# Patient Record
Sex: MALE | Race: AMERICAN INDIAN OR ALASKA NATIVE | Employment: FULL TIME | URBAN - METROPOLITAN AREA
[De-identification: names, ages, dates, MRNs, and addresses within clinical notes are randomized per-mention and may not be internally consistent; named-entity substitution may affect disease eponyms.]

---

## 2018-03-14 ENCOUNTER — HOSPITAL ENCOUNTER (EMERGENCY)
Age: 28
Discharge: HOME | End: 2018-03-14
Attending: EMERGENCY MEDICINE | Admitting: EMERGENCY MEDICINE
Payer: SELF-PAY

## 2018-03-14 ENCOUNTER — APPOINTMENT (OUTPATIENT)
Dept: CT IMAGING | Age: 28
End: 2018-03-14
Attending: EMERGENCY MEDICINE
Payer: SELF-PAY

## 2018-03-14 VITALS
DIASTOLIC BLOOD PRESSURE: 92 MMHG | SYSTOLIC BLOOD PRESSURE: 156 MMHG | TEMPERATURE: 98.5 F | HEART RATE: 60 BPM | OXYGEN SATURATION: 98 % | RESPIRATION RATE: 18 BRPM

## 2018-03-14 DIAGNOSIS — R10.84 ABDOMINAL PAIN, GENERALIZED: Primary | ICD-10-CM

## 2018-03-14 LAB
ALBUMIN SERPL-MCNC: 4.2 G/DL (ref 3.4–5)
ALBUMIN/GLOB SERPL: 1.1 {RATIO} (ref 0.8–1.7)
ALP SERPL-CCNC: 88 U/L (ref 45–117)
ALT SERPL-CCNC: 35 U/L (ref 16–61)
ANION GAP SERPL CALC-SCNC: 6 MMOL/L (ref 3–18)
APPEARANCE UR: CLEAR
AST SERPL-CCNC: 20 U/L (ref 15–37)
BASOPHILS # BLD: 0 K/UL (ref 0–0.1)
BASOPHILS NFR BLD: 1 % (ref 0–2)
BILIRUB SERPL-MCNC: 0.2 MG/DL (ref 0.2–1)
BILIRUB UR QL: NEGATIVE
BUN SERPL-MCNC: 11 MG/DL (ref 7–18)
BUN/CREAT SERPL: 13 (ref 12–20)
CALCIUM SERPL-MCNC: 9.4 MG/DL (ref 8.5–10.1)
CHLORIDE SERPL-SCNC: 103 MMOL/L (ref 100–108)
CO2 SERPL-SCNC: 30 MMOL/L (ref 21–32)
COLOR UR: YELLOW
CREAT SERPL-MCNC: 0.85 MG/DL (ref 0.6–1.3)
DIFFERENTIAL METHOD BLD: ABNORMAL
EOSINOPHIL # BLD: 0.7 K/UL (ref 0–0.4)
EOSINOPHIL NFR BLD: 10 % (ref 0–5)
ERYTHROCYTE [DISTWIDTH] IN BLOOD BY AUTOMATED COUNT: 13 % (ref 11.6–14.5)
GLOBULIN SER CALC-MCNC: 3.8 G/DL (ref 2–4)
GLUCOSE SERPL-MCNC: 120 MG/DL (ref 74–99)
GLUCOSE UR STRIP.AUTO-MCNC: NEGATIVE MG/DL
HCT VFR BLD AUTO: 41.1 % (ref 36–48)
HGB BLD-MCNC: 13.8 G/DL (ref 13–16)
HGB UR QL STRIP: NEGATIVE
KETONES UR QL STRIP.AUTO: NEGATIVE MG/DL
LEUKOCYTE ESTERASE UR QL STRIP.AUTO: NEGATIVE
LIPASE SERPL-CCNC: 139 U/L (ref 73–393)
LYMPHOCYTES # BLD: 2.4 K/UL (ref 0.9–3.6)
LYMPHOCYTES NFR BLD: 36 % (ref 21–52)
MCH RBC QN AUTO: 27 PG (ref 24–34)
MCHC RBC AUTO-ENTMCNC: 33.6 G/DL (ref 31–37)
MCV RBC AUTO: 80.4 FL (ref 74–97)
MONOCYTES # BLD: 0.5 K/UL (ref 0.05–1.2)
MONOCYTES NFR BLD: 8 % (ref 3–10)
NEUTS SEG # BLD: 3.1 K/UL (ref 1.8–8)
NEUTS SEG NFR BLD: 45 % (ref 40–73)
NITRITE UR QL STRIP.AUTO: NEGATIVE
PH UR STRIP: 6.5 [PH] (ref 5–8)
PLATELET # BLD AUTO: 262 K/UL (ref 135–420)
PMV BLD AUTO: 9.1 FL (ref 9.2–11.8)
POTASSIUM SERPL-SCNC: 3.6 MMOL/L (ref 3.5–5.5)
PROT SERPL-MCNC: 8 G/DL (ref 6.4–8.2)
PROT UR STRIP-MCNC: NEGATIVE MG/DL
RBC # BLD AUTO: 5.11 M/UL (ref 4.7–5.5)
SODIUM SERPL-SCNC: 139 MMOL/L (ref 136–145)
SP GR UR REFRACTOMETRY: >1.03 (ref 1–1.03)
UROBILINOGEN UR QL STRIP.AUTO: 0.2 EU/DL (ref 0.2–1)
WBC # BLD AUTO: 6.6 K/UL (ref 4.6–13.2)

## 2018-03-14 PROCEDURE — 83690 ASSAY OF LIPASE: CPT | Performed by: EMERGENCY MEDICINE

## 2018-03-14 PROCEDURE — 74011636320 HC RX REV CODE- 636/320: Performed by: EMERGENCY MEDICINE

## 2018-03-14 PROCEDURE — 87491 CHLMYD TRACH DNA AMP PROBE: CPT | Performed by: EMERGENCY MEDICINE

## 2018-03-14 PROCEDURE — 74011250636 HC RX REV CODE- 250/636: Performed by: EMERGENCY MEDICINE

## 2018-03-14 PROCEDURE — 85025 COMPLETE CBC W/AUTO DIFF WBC: CPT | Performed by: EMERGENCY MEDICINE

## 2018-03-14 PROCEDURE — 74177 CT ABD & PELVIS W/CONTRAST: CPT

## 2018-03-14 PROCEDURE — 80053 COMPREHEN METABOLIC PANEL: CPT | Performed by: EMERGENCY MEDICINE

## 2018-03-14 PROCEDURE — 96375 TX/PRO/DX INJ NEW DRUG ADDON: CPT

## 2018-03-14 PROCEDURE — 87086 URINE CULTURE/COLONY COUNT: CPT | Performed by: EMERGENCY MEDICINE

## 2018-03-14 PROCEDURE — 96374 THER/PROPH/DIAG INJ IV PUSH: CPT

## 2018-03-14 PROCEDURE — 99283 EMERGENCY DEPT VISIT LOW MDM: CPT

## 2018-03-14 PROCEDURE — 81003 URINALYSIS AUTO W/O SCOPE: CPT | Performed by: EMERGENCY MEDICINE

## 2018-03-14 RX ORDER — CEFTRIAXONE 250 MG/8ML
250 INJECTION, POWDER, FOR SOLUTION INTRAMUSCULAR; INTRAVENOUS ONCE
Status: COMPLETED | OUTPATIENT
Start: 2018-03-14 | End: 2018-03-14

## 2018-03-14 RX ORDER — DOXYCYCLINE 100 MG/1
100 CAPSULE ORAL 2 TIMES DAILY
Qty: 42 CAP | Refills: 0 | Status: SHIPPED | OUTPATIENT
Start: 2018-03-14 | End: 2018-04-04

## 2018-03-14 RX ORDER — KETOROLAC TROMETHAMINE 30 MG/ML
30 INJECTION, SOLUTION INTRAMUSCULAR; INTRAVENOUS
Status: COMPLETED | OUTPATIENT
Start: 2018-03-14 | End: 2018-03-14

## 2018-03-14 RX ADMIN — IOPAMIDOL 100 ML: 612 INJECTION, SOLUTION INTRAVENOUS at 20:32

## 2018-03-14 RX ADMIN — CEFTRIAXONE SODIUM 250 MG: 250 INJECTION, POWDER, FOR SOLUTION INTRAMUSCULAR; INTRAVENOUS at 21:07

## 2018-03-14 RX ADMIN — KETOROLAC TROMETHAMINE 30 MG: 30 INJECTION, SOLUTION INTRAMUSCULAR; INTRAVENOUS at 21:04

## 2018-03-14 NOTE — ED NOTES
7:35 PM : Pt care transferred to Dr. Monika Freeman  ,ED provider. History of patient complaint(s), available diagnostic reports and current treatment plan has been discussed thoroughly. Bedside rounding on patient occured : yes . Intended disposition of patient : TBD  Pending diagnostics reports and/or labs (please list):     10:10 PM Patient no longer has any abd pain. Dr. Monika Freeman wanted to discharge on 3 weeks of Doxycycline. Will DC. Prostate reportedly benign on rectal exam.      Scribe Attestation     Antonieta Russo acting as a scribe for and in the presence of Momo Saldivar MD      March 14, 2018 at 7:36 PM       Provider Attestation:      I personally performed the services described in the documentation, reviewed the documentation, as recorded by the scribe in my presence, and it accurately and completely records my words and actions.  March 14, 2018 at 7:36 PM - Momo Saldivar MD

## 2018-03-14 NOTE — ED PROVIDER NOTES
EMERGENCY DEPARTMENT HISTORY AND PHYSICAL EXAM    6:29 PM      Date: 3/14/2018  Patient Name: Elijah Ruggiero    History of Presenting Illness     Chief Complaint   Patient presents with    Abdominal Pain    Urinary Pain         History Provided By: Patient    Chief Complaint: Right sided abdominal pain radiating down into his legs bilaterally  Duration:  Weeks  Timing:  Acute, Constant and Progressive  Location: abdomen and bilateral LE  Quality: Aching and Sharp  Severity: Moderate  Modifying Factors: sitting or making BM makes pain worse. Associated Symptoms: pain when making BM      Additional History (Context): Elijah Ruggiero is a [address] [place] y.o. male with No significant past medical history who presents with mild to moderate right sided abdominal pain that is now radiating down in to his legs for the past 3 weeks. The pt also reports he is pain when he is sitting and also having pain when making a BM. Before this he has never had symptoms like this in the past. Denies any abdominal surgeries. He works in construction. Denies any recent trauma or injury. Reports he has only 1 sexual partner. The pt denies CP, SOB, incontinence, fever, chills, weakness, and numbness. No other complaints or concerns in the ED. PCP: Josue Banuelos MD    Current Outpatient Prescriptions   Medication Sig Dispense Refill    dicyclomine (BENTYL) 20 mg tablet Take 1 Tab by mouth every eight (8) hours as needed (abdominal cramps) for up to 15 doses. 15 Tab 0    methocarbamol (ROBAXIN) 750 mg tablet Take 1 Tab by mouth four (4) times daily. 12 Tab 0       Past History     Past Medical History:  Past Medical History:   Diagnosis Date    Obesity (BMI 30.0-34. 9)        Past Surgical History:  No past surgical history on file.     Family History:  Family History   Problem Relation Age of Onset    No Known Problems Mother     No Known Problems Father        Social History:  Social History   Substance Use Topics    Smoking status: Never Smoker    Smokeless tobacco: Never Used    Alcohol use No       Allergies:  No Known Allergies      Review of Systems       Review of Systems   Constitutional: Negative for chills and fever. Respiratory: Negative for shortness of breath. Cardiovascular: Negative for chest pain. Gastrointestinal: Positive for abdominal pain and rectal pain. Negative for diarrhea, nausea and vomiting. Pain during BM   Genitourinary: Negative for dysuria. Musculoskeletal: Positive for myalgias. Negative for neck stiffness. Neurological: Negative for dizziness, weakness, numbness and headaches. All other systems reviewed and are negative. Physical Exam     Visit Vitals    BP (!) 156/92 (BP 1 Location: Right arm, BP Patient Position: At rest)    Pulse 60    Temp 98.5 °F (36.9 °C)    Resp 18    SpO2 98%         Physical Exam   Constitutional: He is oriented to person, place, and time. He appears well-developed and well-nourished. No distress. HENT:   Head: Normocephalic and atraumatic. Right Ear: External ear normal.   Left Ear: External ear normal.   Nose: Nose normal.   Mouth/Throat: Oropharynx is clear and moist.   Eyes: Conjunctivae and EOM are normal. Pupils are equal, round, and reactive to light. No scleral icterus. Neck: Normal range of motion. Neck supple. No JVD present. No tracheal deviation present. No thyromegaly present. Cardiovascular: Normal rate, regular rhythm, normal heart sounds and intact distal pulses. Exam reveals no gallop and no friction rub. No murmur heard. Pulmonary/Chest: Effort normal and breath sounds normal. He exhibits no tenderness. Abdominal: Soft. Bowel sounds are normal. He exhibits no distension. There is tenderness. There is no rebound and no guarding. RLQ pain    Genitourinary: Rectal exam shows guaiac negative stool. No penile tenderness.    Genitourinary Comments: Mild penile discharge, R inguinal fullness, No testicular pain   Prostate mildly tender, partially palpated    Musculoskeletal: Normal range of motion. He exhibits no edema or tenderness. Lymphadenopathy:     He has no cervical adenopathy. Neurological: He is alert and oriented to person, place, and time. No cranial nerve deficit. Coordination normal.   No sensory loss, Gait normal, Motor 5/5   Skin: Skin is warm and dry. Psychiatric: He has a normal mood and affect. His behavior is normal. Judgment and thought content normal.   Nursing note and vitals reviewed. Diagnostic Study Results     Labs -  Recent Results (from the past 12 hour(s))   URINALYSIS W/ RFLX MICROSCOPIC    Collection Time: 03/14/18  5:40 PM   Result Value Ref Range    Color YELLOW      Appearance CLEAR      Specific gravity >1.030 (H) 1.005 - 1.030    pH (UA) 6.5 5.0 - 8.0      Protein NEGATIVE  NEG mg/dL    Glucose NEGATIVE  NEG mg/dL    Ketone NEGATIVE  NEG mg/dL    Bilirubin NEGATIVE  NEG      Blood NEGATIVE  NEG      Urobilinogen 0.2 0.2 - 1.0 EU/dL    Nitrites NEGATIVE  NEG      Leukocyte Esterase NEGATIVE  NEG         Radiologic Studies -   CT ABD PELV W CONT    (Results Pending)         Medical Decision Making   I am the first provider for this patient. I reviewed the vital signs, available nursing notes, past medical history, past surgical history, family history and social history. Vital Signs-Reviewed the patient's vital signs. Pulse Oximetry Analysis -  98 on room air (Interpretation) normal.    Records Reviewed: Nursing Notes and Old Medical Records (Time of Review: 6:29 PM)    ED Course: Progress Notes, Reevaluation, and Consults:      Provider Notes (Medical Decision Making): Pt is a 28yo male with a hx of recurrent RLQ pain with 2 CT scans in the past year, prostatitis as diagnosed by urology presents with 3 weeks of rectal pain, dysuria, and RLQ pain. Pt is eating well and denies weight loss. It sounds like a prostatitis.   I ordered a CT on him but then looked he had 2 reassuring scans in the past year. With an , pt notes he took the doxy without improvement. He notes that the pain increases with lifting and radiates to his back. He denies any bowel changes except for pain with BM. Will proceed with CT and if reassuring start abx with close outpatient care. Cristine Em DO 7:38 PM    7:38 PM : Pt care transferred to Dr. Margarita Jin  ,ED provider. History of patient complaint(s), available diagnostic reports and current treatment plan has been discussed thoroughly. Intended disposition of patient : TBD  Pending diagnostics reports and/or labs (please list): CT AP, dc with abx for prostatitis        Diagnosis     Disposition: Pending      Follow-up Information     None           Patient's Medications   Start Taking    No medications on file   Continue Taking    DICYCLOMINE (BENTYL) 20 MG TABLET    Take 1 Tab by mouth every eight (8) hours as needed (abdominal cramps) for up to 15 doses. METHOCARBAMOL (ROBAXIN) 750 MG TABLET    Take 1 Tab by mouth four (4) times daily. These Medications have changed    No medications on file   Stop Taking    No medications on file     _______________________________    Attestations:  [address]  acting as a scribe for and in the presence of Arpita Hassan MD      March 14, 2018 at 6:29 PM       Provider Attestation:      I personally performed the services described in the documentation, reviewed the documentation, as recorded by the scribe in my presence, and it accurately and completely records my words and actions.  March 14, 2018 at 6:29 PM - Arpita Hassan MD    _______________________________

## 2018-03-15 NOTE — DISCHARGE INSTRUCTIONS
Dolor abdominal: Instrucciones de cuidado - [ Abdominal Pain: Care Instructions ]  Instrucciones de cuidado    El dolor abdominal tiene muchas causas posibles. Algunas de ellas no son graves y mejoran por sí solas en unos días. Otras requieren [place] y [place]. Si moss dolor continúa o [place], necesitará maria elena nueva revisión y [place] pruebas para determinar qué pasa. Es posible que necesite cirugía para corregir el problema. No ignore nuevos síntomas, ricarda fiebre, náuseas y [place], [facility], dolor que [place] o [place]. Podrían ser señales de un problema más grave. Moss médico puede haberle recomendado maria elena consulta de [employer] las 8 o 12 horas siguientes. Si no se siente mejor, es posible que requiera [place] o [place]. El médico lo kumar revisado minuciosamente, perez puede julieth problemas más tarde. Si nota algún problema o síntomas nuevos, busque tratamiento médico inmediatamente. La atención de seguimiento es maria elena parte clave de moss tratamiento y seguridad. Asegúrese de hacer y acudir a todas las citas, y llame a moss médico si está teniendo problemas. También es maria elena buena idea saber los resultados de los exámenes y mantener maria elena lista de los medicamentos que dunia. ¿Cómo puede cuidarse en el hogar? · Descanse hasta que se sienta mejor. · Para prevenir la deshidratación, sade abundantes líquidos, suficientes para que moss orina sea de color [place] christian o transparente ricarda el agua. Elija beber agua y otros líquidos imani sin cafeína hasta que se sienta mejor. Si tiene [employer], del corazón o del hígado y tiene que [employer] líquidos, hable con moss médico antes de aumentar moss consumo. · Si tiene [employer], coma alimentos suaves, ricarda arroz, pan nando seco o galletas saladas, bananas (plátanos) y puré de [employer]. Trate de comer varias comidas pequeñas al día en lugar de dos o chito grandes.   · Espere hasta 50 horas después de que [employer] síntomas hayan desaparecido antes de comer alimentos condimentados, alcohol y bebidas que contengan cafeína. · No consuma alimentos ricos en grasa. · Evite medicamentos antiinflamatorios ricarda aspirina, ibuprofeno (Advil, Motrin) y naproxeno (Aleve). Pueden causar [employer]. Dígale a moss médico si está tomando aspirina diariamente debido a otro problema de judy. ¿Cuándo debe pedir ayuda? Llame al 911 en cualquier momento que considere que necesita atención de emergencia. Por ejemplo, llame si:  ? · Se desmayó (perdió el conocimiento). ? · Las heces son de color rojizo o muy sanguinolentas (con jose). ? · Vomita jose o algo parecido a granos de café molido. ? · Tiene dolor abdominal nuevo e intenso. ? Llame a moss médico ahora mismo o busque atención médica inmediata si:  ? · Moss dolor empeora, sobre todo si se concentra en maria elena kathie parte del vientre. ? · Vuelve a tener fiebre o tiene fiebre más reji. ? · Grace heces son negruzcas y parecidas al alquitrán o tienen rastros de [place]. ? · Tiene sangrado vaginal inesperado. ? · Tiene síntomas de maria elena infección del tracto urinario. Estos podrían incluir:  ¨ Dolor al [place]. ¨ Orinar con más frecuencia que lo habitual.  ¨ Jose en la [place]. ? · [employer] o aturdimiento, o que está a punto de [place]. ?Preste especial atención a los cambios en moss judy y asegúrese de comunicarse con moss médico si:  ? · No está mejorando después de 1 día (24 horas). ¿Dónde puede encontrar más información en inglés? Bruce Presume a [email or web address]-cami.info/. Al[place] B699 en la búsqueda para aprender más acerca de \"Dolor abdominal: Instrucciones de cuidado - [ Abdominal Pain: Care Instructions ]. \"  Revisado: 20 [address] 2017  Versión del contenido: 11.4  © 7188-4545 [employer]wise, Incorporated. Las instrucciones de cuidado fueron adaptadas bajo licencia por [employer] (which disclaims liability or warranty for this information).  Si usted tiene [place] afección médica o sobre estas instrucciones, siempre pregunte a moss profesional de judy. [facility] niega toda garantía o responsabilidad por moss uso de esta información.

## 2018-03-16 LAB
BACTERIA SPEC CULT: NORMAL
C TRACH RRNA SPEC QL NAA+PROBE: NEGATIVE
N GONORRHOEA RRNA SPEC QL NAA+PROBE: NEGATIVE
SERVICE CMNT-IMP: NORMAL
SPECIMEN SOURCE: NORMAL

## 2023-01-31 RX ORDER — ACETAMINOPHEN 325 MG/1
650 TABLET ORAL EVERY 4 HOURS PRN
COMMUNITY
Start: 2020-09-03

## 2023-01-31 RX ORDER — METHOCARBAMOL 750 MG/1
750 TABLET, FILM COATED ORAL 4 TIMES DAILY
COMMUNITY
Start: 2021-01-16